# Patient Record
Sex: MALE | Race: WHITE
[De-identification: names, ages, dates, MRNs, and addresses within clinical notes are randomized per-mention and may not be internally consistent; named-entity substitution may affect disease eponyms.]

---

## 2017-01-06 ENCOUNTER — HOSPITAL ENCOUNTER (OUTPATIENT)
Dept: HOSPITAL 61 - PCVCINTER | Age: 57
Discharge: HOME | End: 2017-01-06
Attending: INTERNAL MEDICINE
Payer: COMMERCIAL

## 2017-01-06 DIAGNOSIS — R06.02: ICD-10-CM

## 2017-01-06 DIAGNOSIS — R07.89: ICD-10-CM

## 2017-01-06 DIAGNOSIS — E78.00: ICD-10-CM

## 2017-01-06 DIAGNOSIS — Z72.0: ICD-10-CM

## 2017-01-06 DIAGNOSIS — I10: ICD-10-CM

## 2017-01-06 DIAGNOSIS — I25.10: Primary | ICD-10-CM

## 2017-01-06 PROCEDURE — 93458 L HRT ARTERY/VENTRICLE ANGIO: CPT

## 2017-01-06 PROCEDURE — C1769 GUIDE WIRE: HCPCS

## 2017-01-06 PROCEDURE — C1751 CATH, INF, PER/CENT/MIDLINE: HCPCS

## 2017-01-06 PROCEDURE — C1894 INTRO/SHEATH, NON-LASER: HCPCS

## 2017-01-06 PROCEDURE — 75625 CONTRAST EXAM ABDOMINL AORTA: CPT

## 2017-06-26 ENCOUNTER — HOSPITAL ENCOUNTER (OUTPATIENT)
Dept: HOSPITAL 61 - PCVCIMAG | Age: 57
Discharge: HOME | End: 2017-06-26
Attending: INTERNAL MEDICINE
Payer: COMMERCIAL

## 2017-06-26 DIAGNOSIS — I25.10: Primary | ICD-10-CM

## 2017-06-26 DIAGNOSIS — E83.52: ICD-10-CM

## 2017-06-26 DIAGNOSIS — Z87.891: ICD-10-CM

## 2017-06-26 DIAGNOSIS — E78.5: ICD-10-CM

## 2017-06-26 PROCEDURE — 93017 CV STRESS TEST TRACING ONLY: CPT

## 2017-06-26 PROCEDURE — 78452 HT MUSCLE IMAGE SPECT MULT: CPT

## 2017-06-26 PROCEDURE — A9500 TC99M SESTAMIBI: HCPCS

## 2017-06-26 NOTE — PCVCIMAG
--------------- APPROVED REPORT --------------





Exam: Nuclear Stress Test

Indication: CAD, High Ca Score

Patient Location: Out-Patient

Stress Nurse: Deborah Hernandez RN, Cyndy Ford RN

NM Tech:Olivia GomesAMILCAR averyMT



Ht: 5 ft 10 in Wt: 200 lbs BSA:  2.09 m2

HR: 56 bpm                      BP: 159/74 mmHg         BMI:  

28.6

Rhythm:  NSR with incomplete RBBB



Medical History

Medical History: HTN, Hyperlipidemia, CVD, Former Smoker

Medications: Aspirin, lisinopril, coQ10, crestor

Allergies: No known drug allergies

Cardiac Risk Factors: Age

Pretest Chest Pain Characteristics: No chest pain

Exercise History: Physically active



NM EXAM: Myocardial Perfusion REST/STRESS

Imaging Protocol: Rest Tc-99m/Stress Tc-99m 1 day



Resting Data

Rest SPECT myocardial perfusion imaging was performed in supine 

position 45 minutes following the intravenous injection of 10.3 mCi 

of Tc-99m Sestamibi.

Time of rest injection: 0800     Date: 06/26/2017



Pharmacologic Stress

Pharmacologic stress test was performed by injecting Regadenoson 0.4 

mg IV push followed by the intravenous injection of 34.1 mCi of 

Tc-99m Sestamibi.

Time of stress injection: 0915     Date: 06/26/2017

Administration Route: IV

Administration Site: Right Hand

Gated Stress SPECT was performed 45 minutes after stress 

injection.

The images were gated to evaluate regional wall motion and calculate 

left ventricular ejection fraction. 



Study Quality

Study: Good



Study Data

Post stress, the left ventricular ejection was 71%..

SSS: 1

SRS: 1

SDS: 1

TID = 0.87.



Perfusion

No evidence of stress induced ischemia or prior myocardial 

infarction.



Wall Motion

Normal left ventricular size and function with no regional wall 

motion abnormalities.



Nuclear Conclusion

No evidence of stress induced ischemia or prior myocardial 

infarction.

Normal left ventricular size and function with no regional wall 

motion abnormalities.

Post stress, the left ventricular ejection was 71%.. 

No prior study available for comparison.



Interpreted by:  Nash Soriano MD

Electronically Approved: 06/26/2017 13:20:31



Stress Test Details

Stress Test:  Pharmacologic stress was paired with low level 

exercise.

  Reason for pharmacologic stress test: physical 

limitation.

HR

Resting HR:            56 bpmMax Heart Rate (APMHR): 165 bpm 

Max HR Achieved:  72 bpmTarget HR (85% APMHR): 140 bpm

% of APMHR:         43

Recovery HR:            62 bpm



BP

Resting BP:  159/74 mmHg

Max BP:       160/74 mmHg

Recovery BP:       161/76 mmHg

ECG

Resting ECG:  Sinus Bradycardia with incomplete R BBB

Stress ECG:     Sinus Rhythm, NSSTT changes

Recovery ECG: Sinus Rhythm with incomplete R BBB

Recovery ST Change: None



Clinical

Reason for Termination: Completed protocol

Stress Symptoms: Dyspnea, resolved during recovery

Exercise duration: 4 min  sec

Exercise capacity: 1.6 METs



Stress ECG Conclusion

ECG: Non-ischemic



<Conclusion>

ECG: Non-ischemic

## 2018-09-24 ENCOUNTER — HOSPITAL ENCOUNTER (OUTPATIENT)
Dept: HOSPITAL 61 - PCVCIMAG | Age: 58
Discharge: HOME | End: 2018-09-24
Attending: INTERNAL MEDICINE
Payer: COMMERCIAL

## 2018-09-24 DIAGNOSIS — I25.10: Primary | ICD-10-CM

## 2018-09-24 DIAGNOSIS — R06.09: ICD-10-CM

## 2018-09-24 DIAGNOSIS — I10: ICD-10-CM

## 2018-09-24 DIAGNOSIS — E78.5: ICD-10-CM

## 2018-09-24 PROCEDURE — 93351 STRESS TTE COMPLETE: CPT

## 2018-09-24 PROCEDURE — 93325 DOPPLER ECHO COLOR FLOW MAPG: CPT

## 2018-09-24 NOTE — [facility]AG
--------------- APPROVED REPORT --------------





Study performed:  09/24/2018 11:27:59



Exam:  Stress Echocardiogram

Indication: CAD , Hyperlipidemia, Hypertension, Elevated CA Score, 

Dyspnea

Patient Location: Echo lab

Stress Nurse: Heaven Nayak RN

Status: routine



Ht: 5 ft 10 in  

HR: 61 bpm      BP: 140/80 mmHg

Rhythm: NSR



Procedure

The patient underwent an Exercise Stress Test using the Yan 

Protocol. Blood pressure, heart rate, and EKG were monitored.

An Echocardiogram was performed by technician in four stages in quad 

fashion.  At peak stress, four selected images were obtained and 

placed side by side with resting images for comparison.



Stress Test Details

Stress Test:  Exercise stress testing was performed using a Yan 

protocol.

HR

Resting HR:            61 bpmMax Heart Rate (APMHR): 164 bpm 

Max HR Achieved:  157 bpmTarget HR (85% APMHR): 139 bpm

% of APMHR:         95

Recovery HR:            93 bpm

HR response to stress: Normal HR response to stress



BP

Resting BP:  140/80 mmHg

Max BP:       240/80 mmHg

Recovery BP:       146/80 mmHg



ECG

Resting ECG:  Sinus Rhythm

Stress ECG:     Sinus Rhythm

Arrhythmia:    Occasional PVC's

Recovery ECG: Sinus Rhythm

Recovery Arrhythmia: Occasional PVC's



Clinical

Reason for Termination: Maximal effort, HTN

Exercise duration: 9 min 00 sec

Highest Stage Achieved: Stage 4: 4.2 mph at 16% grade. 

Exercise capacity: 10.10 METs

Overall Exercise Capacity for Age: Good



Stress ECG Conclusion

ECG: Non-ischemic

Clinical: Non-ischemic



Pre-Stress Echo

The resting Echocardiogram showed normal left ventricular 

contractility with an estimated Ejection Fraction of about &gt;55%. 

Normal wall motion in all segments on baseline images.



Post-Stress Echo

The stress Echocardiogram showed normal left ventricular 

contractility with an estimated Ejection Fraction of about 60-65%. 

Normal augmentation of wall motion in all segments on post stress 

images.



Clinical

No clinical or ECG evidence for ischemia. Abnormal hypertensive 

response to stress.



Conclusion

Clinical Response:  Non-ischemic

Exercise Capacity:  Average

Stress ECG Response:  Non-ischemic

Stress Echo Images:  Non-ischemic



Other Information

Study Quality: Good

## 2019-12-27 ENCOUNTER — HOSPITAL ENCOUNTER (OUTPATIENT)
Dept: HOSPITAL 61 - PCVCIMAG | Age: 59
Discharge: HOME | End: 2019-12-27
Attending: INTERNAL MEDICINE
Payer: COMMERCIAL

## 2019-12-27 DIAGNOSIS — I35.1: Primary | ICD-10-CM

## 2019-12-27 DIAGNOSIS — I25.10: ICD-10-CM

## 2019-12-27 DIAGNOSIS — Z82.49: ICD-10-CM

## 2019-12-27 PROCEDURE — 93325 DOPPLER ECHO COLOR FLOW MAPG: CPT

## 2019-12-27 PROCEDURE — 93351 STRESS TTE COMPLETE: CPT

## 2019-12-27 NOTE — [facility]AG
--------------- APPROVED REPORT --------------





Study performed:  12/27/2019 14:52:33



Exam:  Stress Echocardiogram

Indication: CAD, dyspnea, fam hx cad

Patient Location: Echo lab

Stress Nurse: Heaven Nayak RN

Status: routine



Ht: 5 ft 10 in  

HR: 60 bpm      BP: 136/80 mmHg

Rhythm: NSR



Procedure

The patient underwent an Exercise Stress Test using the Yan 

Protocol. Blood pressure, heart rate, and EKG were monitored.

An Echocardiogram was performed by technician in four stages in quad 

fashion.  At peak stress, four selected images were obtained and 

placed side by side with resting images for comparison.



Stress Test Details

Stress Test:  Exercise stress testing was performed using a Yan 

protocol.

HR

Resting HR:            60 bpmMax Heart Rate (APMHR): 163 bpm 

Max HR Achieved:  160 bpmTarget HR (85% APMHR): 138 bpm

% of APMHR:         98

Recovery HR:            94 bpm

HR response to stress: Normal HR response to stress



BP

Resting BP:  136/80 mmHg

Max BP:       212/84 mmHg

Recovery BP:       196/90 mmHg

BP response to stress: Normal blood pressure response to 

stress.

ECG

Resting ECG:  Sinus Rhythm

Stress ECG:     Sinus Rhythm

ST Change: Normal

Arrhythmia:    occasional PVC

Recovery ECG: Sinus Rhythm

Recovery ST Change: Normal

Recovery Arrhythmia: None



Clinical

Reason for Termination: Maximal effort

Stress Symptoms: Dyspnea

Exercise duration: 10 min 31 sec

Highest Stage Achieved: Stage 4: 4.2 mph at 16% grade. 

Exercise capacity: 13.4 METs

Overall Exercise Capacity for Age: Normal

Scale: Active

Angina Score: None



Pre-Stress Echo

The resting Echocardiogram showed normal left ventricular 

contractility with an estimated Ejection Fraction of about >55%. 

Normal wall motion in all segments on baseline images.



Post-Stress Echo

The stress Echocardiogram showed normal left ventricular 

contractility with an estimated Ejection Fraction of about 60-65%. 

Equivocal distal inferior hypokinesis.



Clinical

No clinical or ECG evidence for ischemia.



Conclusion

Clinical Response:  Non-ischemic

Exercise Capacity:  Average

Stress ECG Response:  Non-ischemic

Stress Echo Images:  Equivocal- distal inferior hypokinesis

The left ventricle is normal in size and wall thickness in both the 

rest and stress images.

Bicuspid aortic valve with raphe present with mild aortic 

insufficiency and no stenosis. Normal mitral, tricuspid, and pulmonic 

valves.



Other Information

Study Quality: Adequate



<Conclusion>

The left ventricle is normal in size and wall thickness in both the 

rest and stress images.

Bicuspid aortic valve with raphe present with mild aortic 

insufficiency and no stenosis. Normal mitral, tricuspid, and pulmonic 

valves.

## 2020-06-24 ENCOUNTER — HOSPITAL ENCOUNTER (OUTPATIENT)
Dept: HOSPITAL 35 - SJCVCIMAG | Age: 60
End: 2020-06-24
Attending: INTERNAL MEDICINE
Payer: COMMERCIAL

## 2020-06-24 DIAGNOSIS — I65.23: Primary | ICD-10-CM

## 2020-06-24 DIAGNOSIS — E78.5: ICD-10-CM

## 2020-06-24 DIAGNOSIS — I10: ICD-10-CM

## 2020-06-24 DIAGNOSIS — I25.10: ICD-10-CM

## 2020-06-24 DIAGNOSIS — Z87.891: ICD-10-CM
